# Patient Record
Sex: MALE | Race: WHITE
[De-identification: names, ages, dates, MRNs, and addresses within clinical notes are randomized per-mention and may not be internally consistent; named-entity substitution may affect disease eponyms.]

---

## 2022-07-25 ENCOUNTER — HOSPITAL ENCOUNTER (EMERGENCY)
Dept: HOSPITAL 26 - MED | Age: 42
Discharge: HOME | End: 2022-07-25
Payer: MEDICAID

## 2022-07-25 VITALS — HEIGHT: 65 IN | BODY MASS INDEX: 27.82 KG/M2 | WEIGHT: 167 LBS

## 2022-07-25 VITALS — SYSTOLIC BLOOD PRESSURE: 130 MMHG | DIASTOLIC BLOOD PRESSURE: 83 MMHG

## 2022-07-25 DIAGNOSIS — T78.40XA: Primary | ICD-10-CM
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
## 2022-07-25 DIAGNOSIS — G43.909: ICD-10-CM

## 2022-07-25 PROCEDURE — 99284 EMERGENCY DEPT VISIT MOD MDM: CPT

## 2022-07-25 NOTE — NUR
41/M PRESENTS TO ED WITH C/O GENERALIZED BODY RASH SINCE THIS MORNING, DENIES 
RECENT NEW FOOD OR BODY PRODUCTS. PATIENT REPORTS HE TAKES DAILY MEDICATIONS 
FOR PREVIOUS HEAD INJURY AND REPORTS HE BELIEVES THE MEDICATIONS MAY HAVE 
CAUSED THE RASH, DENIES A RECENT CHANGE IN MEDS. DENIES SOB, SPEAKING IN FULL 
CLEAR SENTENCES, DENIES PAIN REPORTS ITCHINESS.

## 2022-07-25 NOTE — NUR
Patient discharged with v/s stable. Written and verbal after care instructions 
given ABOUT RASH and explained. PRESCRIPTION FOR BENADRYL, LORATADINE, AND 
PREDNISONE GIVEN.

Patient verbalized understanding. Ambulatory with steady gait. All questions 
addressed prior to discharge. Advised to follow up with PMD.